# Patient Record
Sex: MALE | Race: WHITE
[De-identification: names, ages, dates, MRNs, and addresses within clinical notes are randomized per-mention and may not be internally consistent; named-entity substitution may affect disease eponyms.]

---

## 2019-08-02 ENCOUNTER — HOSPITAL ENCOUNTER (OUTPATIENT)
Dept: HOSPITAL 72 - GAS | Age: 64
Discharge: HOME | End: 2019-08-02
Payer: MEDICARE

## 2019-08-02 VITALS — SYSTOLIC BLOOD PRESSURE: 115 MMHG | DIASTOLIC BLOOD PRESSURE: 76 MMHG

## 2019-08-02 VITALS — SYSTOLIC BLOOD PRESSURE: 120 MMHG | DIASTOLIC BLOOD PRESSURE: 81 MMHG

## 2019-08-02 VITALS — WEIGHT: 188 LBS | BODY MASS INDEX: 28.49 KG/M2 | HEIGHT: 68 IN

## 2019-08-02 VITALS — DIASTOLIC BLOOD PRESSURE: 78 MMHG | SYSTOLIC BLOOD PRESSURE: 126 MMHG

## 2019-08-02 VITALS — DIASTOLIC BLOOD PRESSURE: 81 MMHG | SYSTOLIC BLOOD PRESSURE: 131 MMHG

## 2019-08-02 VITALS — DIASTOLIC BLOOD PRESSURE: 78 MMHG | SYSTOLIC BLOOD PRESSURE: 128 MMHG

## 2019-08-02 VITALS — DIASTOLIC BLOOD PRESSURE: 82 MMHG | SYSTOLIC BLOOD PRESSURE: 124 MMHG

## 2019-08-02 VITALS — DIASTOLIC BLOOD PRESSURE: 77 MMHG | SYSTOLIC BLOOD PRESSURE: 126 MMHG

## 2019-08-02 VITALS — SYSTOLIC BLOOD PRESSURE: 118 MMHG | DIASTOLIC BLOOD PRESSURE: 77 MMHG

## 2019-08-02 DIAGNOSIS — Z79.899: ICD-10-CM

## 2019-08-02 DIAGNOSIS — Z79.82: ICD-10-CM

## 2019-08-02 DIAGNOSIS — Z79.84: ICD-10-CM

## 2019-08-02 DIAGNOSIS — Z90.49: ICD-10-CM

## 2019-08-02 DIAGNOSIS — K21.9: ICD-10-CM

## 2019-08-02 DIAGNOSIS — E11.9: ICD-10-CM

## 2019-08-02 DIAGNOSIS — Z87.891: ICD-10-CM

## 2019-08-02 DIAGNOSIS — K29.50: ICD-10-CM

## 2019-08-02 DIAGNOSIS — R10.9: ICD-10-CM

## 2019-08-02 DIAGNOSIS — Z12.11: Primary | ICD-10-CM

## 2019-08-02 PROCEDURE — 82962 GLUCOSE BLOOD TEST: CPT

## 2019-08-02 PROCEDURE — 43239 EGD BIOPSY SINGLE/MULTIPLE: CPT

## 2019-08-02 PROCEDURE — 94003 VENT MGMT INPAT SUBQ DAY: CPT

## 2019-08-02 PROCEDURE — 94150 VITAL CAPACITY TEST: CPT

## 2019-08-02 NOTE — OPERATIVE NOTE - DICTATED
DATE OF OPERATION:  08/02/2019

SURGEON:  Clay Marte M.D.



PROCEDURE:  Total colonoscopy.



PREOPERATIVE DIAGNOSIS:  Screening colonoscopy.



POSTOPERATIVE DIAGNOSIS:  Very difficult procedure due to high redundancy

of the colon and poor colon cleanup.  However, total colonoscopy up to the

base of the cecum was normal.



MEDICATION USED:  Per Dr. Savita Mckeon, anesthesiologist.



INSTRUMENT:  GIF Olympus video colonoscope.



DESCRIPTION OF PROCEDURE:  The patient after arriving in endoscopy unit,

was told about risks and benefits of the procedure, which he accepted and

signed informed consent.  He was then put on the left lateral decubitus

position.  After adequate IV sedation, the scope was gently pushed towards

the anal area, introduced into the rectum.  Retroflexion maneuver was

applied here did not reveal any major hemorrhoids.  The rectum was also

clear of any pathology.  However, there was moderate amount of _____

material, which basically signify unclean colon along the colon, which

made the examination very difficult as the left colon was highly redundant

and significant amount of time was passed to be able to go through this

area.  However, there was no any intraluminal pathology such as a

stricture, tumors, polyps, ulcers, etc.  Gradually, the scope reached

towards the splenic flexure, guided into the transverse colon, hepatic

flexure, and finally introduced into the right colon all the way to the

base of the cecum.  All these areas remained to be normal and significant

number of washing time was given due to the patient poor colon cleanup,

which made the examination also in this area quite difficult.  However, as

I mentioned, no particular pathology such as tumors, polyps, stricture,

ulcers, etc. was found.



At this time, within 7 minutes, the scope was gradually pulled out and

there was no other findings and the procedure was terminated.  The patient

tolerated the procedure well and left the endoscopy room in a good

condition.









  ______________________________________________

  Clay Marte M.D.





DR:  LISSY

D:  08/02/2019 11:35

T:  08/02/2019 21:32

JOB#:  [id number]

CC:

## 2019-08-02 NOTE — IMMEDIATE POST-OP EVALUATION
Immediate Post-Op Evalulation


Immediate Post-Op Evalulation


Procedure:  egd/colonoscopy w/bx


Date of Evaluation:  Aug 2, 2019


Time of Evaluation:  11:45


IV Fluids:  500ml lr


Blood Products:  none


Estimated Blood Loss:  negligible


Blood Pressure Systolic:  115


Blood Pressure Diastolic:  76


Pulse Rate:  56


Respiratory Rate:  18


O2 Sat by Pulse Oximetry:  100


Temperature (Fahrenheit):  97.1


Pain Score (1-10):  0


Nausea:  No


Vomiting:  No


Complications


none


Patient Status:  awake, reacts, patent


Hydration Status:  adequate


Drug:  Savita Montenegro MD Aug 2, 2019 11:56

## 2019-08-02 NOTE — OPERATIVE NOTE - DICTATED
DATE OF OPERATION:  08/02/2019

SURGEON:  Clay Marte M.D.



PROCEDURE:  Esophagogastroduodenoscopy with biopsy.



PREOPERATIVE DIAGNOSES:

1. Abdominal pain.

2. History of GERD.



POSTOPERATIVE DIAGNOSES:

1. Completely normal upper GI endoscopy.  Biopsy was taken per random

from gastric body.

2. Moderate amount of bile in the stomach noted.



MEDICATION USED:  Per Dr. Savita Mckeon, anesthesiologist.



INSTRUMENT:  GIF Olympus upper GI video endoscope.



DESCRIPTION OF PROCEDURE:  The patient after arriving at the endoscopy

unit, was told about risks and benefits of the procedure, which he

accepted and signed informed consent.  At this time, he was put in the

left lateral decubitus position.  After adequate IV sedation, the scope

was gently passed through the cricopharyngeal area, was lodged into the

upper esophagus, and gradually advanced towards gastroesophageal junction.

The entire length of the esophagus was normal.  However, there was some

minimal exudative process over the lower esophagus, which raised the

possibility of underlying Candida esophagus.  Therefore, brushing was also

done.  At this point, the scope was advanced into the stomach.  There was

no any evidence of hiatal hernia or Grigsby's.  The areas of the fundus

and the body and the antrum were examined in an orderly fashion, which

revealed normal finding except that there was moderate amount of bile in

the stomach, which had to be suctioned.  Underlying gastric mucosa looked

completely normal.  No ulcers, tumors, polyps, etc. was found.  At this

time, one random biopsy from gastric body was obtained and subsequently

scope was passed through the antrum, pylorus, and first and second portion

of duodenum were also found to be completely normal.  Upon withdrawing the

scope into the stomach a retroflexion maneuver was applied and the area of

the gastroesophageal junction was examined in a closer fashion, which

revealed normal finding.  Finally, the scope was pulled out and procedure

was terminated.  The patient tolerated the procedure well.









  ______________________________________________

  Clay Marte M.D.





DR:  LISSY

D:  08/02/2019 11:33

T:  08/02/2019 21:20

JOB#:  [id number]

CC:

## 2019-08-02 NOTE — DISCHARGE INSTRUCTIONS
Discharge Instructions


Discharge Instructions


Follow up with:  visit the doctor in the office after 2 weeks





For Congestive Heart Failure


Reminder


Report to your physician any weight gain of 5 pounds or more in one week.











Clay Marte MD Aug 2, 2019 11:28

## 2019-08-02 NOTE — ANETHESIA PREOPERATIVE EVAL
Anesthesia Pre-op PMH/ROS


General


Date of Evaluation:  Aug 2, 2019


Time of Evaluation:  06:51


Anesthesiologist:  troy


ASA Score:  ASA 3


Mallampati Score


Class I : Soft palate, uvula, fauces, pillars visible


Class II: Soft palate, uvula, fauces visible


Class III: Soft palate, base of uvula visible


Class IV: Only hard plate visible


Mallampati Classification:  Class II


Surgeon:  hoa


Diagnosis:  gerd, colo screening


Surgical Procedure:  egd/colonoscopy


Anesthesia History:  none


Social History:  smoking - former smoker


Family History:  no anesthesia problems


Allergies:  


Coded Allergies:  


     NO KNOWN DRUG ALLERGIES (Verified  Allergy, Unknown, 8/2/19)


Medications:  see eMAR


Patient NPO?:  Yes





Past Medical History


Gastrointestinal/Genitourinary:  Reports: GERD, other - gallbladder disease


Endocrine:  Reports: DM


HEENT:  Reports: glaucoma


PSxH Narrative:


cholecystectomy





Anesthesia Pre-op Phys. Exam


Physician Exam





Last Vital Signs








  Date Time  Temp Pulse Resp B/P (MAP) Pulse Ox O2 Delivery O2 Flow Rate FiO2


 


8/2/19 09:42      Room Air  


 


8/2/19 09:39 97.9 63 18 131/81 98   








Constitutional:  NAD


Neurologic:  CN 2-12 intact


Cardiovascular:  RRR


Respiratory:  CTA


Gastrointestinal:  S/NT/ND





Airway Exam


Mallampati Score:  Class II


MO:  limited


Neck:  flexible


TMD:  2fb


ROM:  limited





Anesthesia Pre-op A/P


Risk Assessment & Plan


Assessment:


asa3


Plan:


mac


Status Change Before Surgery:  No





Pre-Antibiotics


Drug:  Savita Montenegro MD Aug 2, 2019 06:54

## 2019-08-02 NOTE — ENDOSCOPY PROCEDURE NOTE
Endoscopy Procedure Note


General


Indication for Procedure:  Abdominal pains and screening colon


Procedures Performed:  EGD - Normal Upper GI endoscopy , biopsy was taken per 

random from gastric body., colonoscopy - Extremely difficult procedure due to 

poor clean up and high redundancy of colon otherwise completely normal total 

colonoscopy upto the base of the cecum.


Specimen:  yes


Pt Tolerated Procedure Well:  Yes


Estimated Blood Loss:  none





Anesthesia


Anesthesiologist:  Dr. Alcantara


Anesthesia:  moderate sedation





Medications


Medication Given:  see anesthesia record





Inserted Devices


Implant(s) used?:  No





Quality


Quality of Bowel Preparation:  Poor


Did scope reach the cecum?:  Yes


Was there any complications?:  No





GI Core Measures


50 yrs or older w/o bx or poly:  Yes


10yrs. F/U recommended:  Yes


If not recommended, why?:  


Med reason:<3 yrs.:  


System Reason:<3 yrs.:  


Last colonoscopy >= to 3yrs:  Yes











Clay Marte MD Aug 2, 2019 11:27

## 2019-08-02 NOTE — PRE-PROCEDURE NOTE/ATTESTATION
Pre-Procedure Note/Attestation


Complete Prior to Procedure


Planned Procedure:  left


Procedure Narrative:


Examination of the upper and the lower GI tracts via endoscopy.





Indications for Procedure


Pre-Operative Diagnosis:


None.





Attestation


I attest that I discussed the nature of the procedure; its benefits; risks and 

complications; and alternatives (and the risks and benefits of such alternatives

), prior to the procedure, with the patient (or the patient's legal 

representative).





I attest that, if there was a reasonable possibility of needing a blood 

transfusion, the patient (or the patient's legal representative) was given the 

[facility] standardized written summary, pursuant 

to the [place] Blood Safety Act ([place] Health and Safety Code # 1645, as 

amended).





I attest that I re-evaluated the patient just prior to the surgery and that 

there has been no change in the patient's H&P, except as documented below:











Clay Marte MD Aug 2, 2019 10:34

## 2019-08-02 NOTE — SHORT STAY SURGERY H&P
History of Present Illness


History of Present Illness


Chief Complaint


Abdominal pains/GERDs/screening colonoscopy


CALI Casiano is a 64 year old male who was admitted on  for Screening Colon, 

Gerds





Patient History


Allergies:  


Coded Allergies:  


     NO KNOWN DRUG ALLERGIES (Verified  Allergy, Unknown, 8/2/19)


PAST MEDICAL HISTORY:  


(1) Diabetes


(2) BPH (benign prostatic hyperplasia)


(3) History of cholecystectomy


(4) History of brain surgery





Medication History


Scheduled


Buprenorphine Hcl/Naloxone Hcl* (Suboxone 8 Mg-2 Mg Sl Film*), 1 EACH SL DAILY, 

(Reported)


Linaclotide (Linzess), 145 MCG PO DA, (Reported)


Losartan Potassium* (Losartan Potassium*), 50 MG ORAL DAILY, (Reported)


Metformin Hcl* (Metformin Hcl*), 500 MG ORAL BID, (Reported)





Discontinued Medications


Aspirin* (Aspir-Low*), 81 MG ORAL DAILY, (Reported)


   Discontinued Reason: Pt stopped taking med





Review of Systems


Cardiovascular:  Reports: no symptoms


Respiratory:  Reports: no symptoms


Skeletal:  Reports: no symptoms


Gastrointestinal:  Reports: gastro esophageal reflux disease


Genitourinary:  Reports: BPH


Neurologic:  Reports: no symptoms


Endocrine:  Reports: no symptoms


Hematologic:  Reports: no symptoms





Physical Exam


Vital Signs





Last Vital Signs








  Date Time  Temp Pulse Resp B/P (MAP) Pulse Ox O2 Delivery O2 Flow Rate FiO2


 


8/2/19 09:42      Room Air  


 


8/2/19 09:39 97.9 63 18 131/81 98   








Skin:  normal


HENT:  normal


Heart:  normal


Lungs:  normal


Abdomen:  abnormal


Extremities:  normal


Genitourinary:  normal





Plan


Plan of Care


Upper and lower GI endoscopies with possible biopsy.


Preop Interventions


None.


Summary of Findings


See the reports.


Attestation


Are the patient's medical conditions optimized for surgery?


Attestation Response:  yes











Clay Marte MD Aug 2, 2019 10:33

## 2019-08-02 NOTE — 48 HOUR POST ANESTHESIA EVAL
Post Anesthesia Evaluation


Procedure:  egd/colonoscopy w/bx


Date of Evaluation:  Aug 2, 2019


Time of Evaluation:  11:47


Blood Pressure Systolic:  118


0:  77


Pulse Rate:  57


Respiratory Rate:  18


Temperature (Fahrenheit):  97.1


O2 Sat by Pulse Oximetry:  100


Airway:  patent


Nausea:  No


Vomiting:  No


Pain Intensity:  0


Hydration Status:  adequate


Cardiopulmonary Status:


stable


Mental Status/LOC:  patient returned to baseline


Post-Anesthesia Complications:


none


Follow-up care needed:  N/A











Savita Alcantara MD Aug 2, 2019 11:57